# Patient Record
Sex: MALE | Race: WHITE | NOT HISPANIC OR LATINO | Employment: OTHER | URBAN - NONMETROPOLITAN AREA
[De-identification: names, ages, dates, MRNs, and addresses within clinical notes are randomized per-mention and may not be internally consistent; named-entity substitution may affect disease eponyms.]

---

## 2017-04-28 ENCOUNTER — HOSPITAL ENCOUNTER (INPATIENT)
Facility: HOSPITAL | Age: 72
LOS: 1 days | Discharge: HOME | End: 2017-04-28
Admitting: INTERNAL MEDICINE

## 2017-04-28 ENCOUNTER — HOSPITAL ENCOUNTER (INPATIENT)
Facility: HOSPITAL | Age: 72
LOS: 3 days | Discharge: HOME | End: 2017-05-01
Attending: INTERNAL MEDICINE | Admitting: INTERNAL MEDICINE

## 2017-04-28 VITALS
HEIGHT: 67 IN | RESPIRATION RATE: 18 BRPM | TEMPERATURE: 97 F | BODY MASS INDEX: 33.51 KG/M2 | SYSTOLIC BLOOD PRESSURE: 115 MMHG | OXYGEN SATURATION: 100 % | DIASTOLIC BLOOD PRESSURE: 78 MMHG | WEIGHT: 213.5 LBS | HEART RATE: 71 BPM

## 2017-04-28 DIAGNOSIS — E78.2 MIXED HYPERLIPIDEMIA: ICD-10-CM

## 2017-04-28 DIAGNOSIS — R09.81 HEAD CONGESTION: Primary | ICD-10-CM

## 2017-04-28 PROCEDURE — 93005 ELECTROCARDIOGRAM TRACING: CPT | Performed by: INTERNAL MEDICINE

## 2017-04-28 PROCEDURE — 84484 ASSAY OF TROPONIN QUANT: CPT | Performed by: INTERNAL MEDICINE

## 2017-04-28 RX ORDER — ASPIRIN 81 MG/1
81 TABLET ORAL DAILY
COMMUNITY

## 2017-04-28 RX ORDER — EPTIFIBATIDE 0.75 MG/ML
2 INJECTION, SOLUTION INTRAVENOUS CONTINUOUS
Status: DISCONTINUED | OUTPATIENT
Start: 2017-04-29 | End: 2017-04-29

## 2017-04-28 RX ORDER — METOPROLOL TARTRATE 50 MG/1
50 TABLET, FILM COATED ORAL 2 TIMES DAILY
Status: ON HOLD | COMMUNITY
End: 2017-05-01

## 2017-04-28 RX ORDER — NITROGLYCERIN 0.4 MG/1
0.4 TABLET SUBLINGUAL
Status: DISCONTINUED | OUTPATIENT
Start: 2017-04-28 | End: 2017-05-01 | Stop reason: HOSPADM

## 2017-04-28 RX ORDER — ACETAMINOPHEN 325 MG/1
650 TABLET ORAL EVERY 6 HOURS PRN
Status: DISCONTINUED | OUTPATIENT
Start: 2017-04-28 | End: 2017-05-01 | Stop reason: HOSPADM

## 2017-04-28 RX ORDER — EPTIFIBATIDE 20 MG/10ML
180 INJECTION INTRAVENOUS ONCE
Status: DISCONTINUED | OUTPATIENT
Start: 2017-04-29 | End: 2017-04-29

## 2017-04-28 RX ADMIN — EPTIFIBATIDE 2 MCG/KG/MIN: 0.75 INJECTION, SOLUTION INTRAVENOUS at 23:57

## 2017-04-29 ENCOUNTER — APPOINTMENT (OUTPATIENT)
Dept: GENERAL RADIOLOGY | Facility: HOSPITAL | Age: 72
End: 2017-04-29

## 2017-04-29 ENCOUNTER — APPOINTMENT (OUTPATIENT)
Dept: CT IMAGING | Facility: HOSPITAL | Age: 72
End: 2017-04-29

## 2017-04-29 PROBLEM — R55 SYNCOPE: Status: ACTIVE | Noted: 2017-04-29

## 2017-04-29 LAB
ALBUMIN SERPL-MCNC: 3.1 G/DL (ref 3.5–5)
ALBUMIN/GLOB SERPL: 0.8 G/DL (ref 1.1–2.5)
ALP SERPL-CCNC: 84 U/L (ref 24–120)
ALT SERPL W P-5'-P-CCNC: 26 U/L (ref 0–54)
ANION GAP SERPL CALCULATED.3IONS-SCNC: 10 MMOL/L (ref 4–13)
AST SERPL-CCNC: 47 U/L (ref 7–45)
BILIRUB SERPL-MCNC: 1 MG/DL (ref 0.1–1)
BUN BLD-MCNC: 15 MG/DL (ref 5–21)
BUN/CREAT SERPL: 18.3 (ref 7–25)
CALCIUM SPEC-SCNC: 8.2 MG/DL (ref 8.4–10.4)
CHLORIDE SERPL-SCNC: 104 MMOL/L (ref 98–110)
CO2 SERPL-SCNC: 25 MMOL/L (ref 24–31)
CREAT BLD-MCNC: 0.82 MG/DL (ref 0.5–1.4)
DEPRECATED RDW RBC AUTO: 57.3 FL (ref 40–54)
ERYTHROCYTE [DISTWIDTH] IN BLOOD BY AUTOMATED COUNT: 14.2 % (ref 12–15)
GFR SERPL CREATININE-BSD FRML MDRD: 93 ML/MIN/1.73
GLOBULIN UR ELPH-MCNC: 3.7 GM/DL
GLUCOSE BLD-MCNC: 144 MG/DL (ref 70–100)
HCT VFR BLD AUTO: 33.9 % (ref 40–52)
HGB BLD-MCNC: 11.9 G/DL (ref 14–18)
INR PPP: 1.11 (ref 0.91–1.09)
MCH RBC QN AUTO: 39.4 PG (ref 28–32)
MCHC RBC AUTO-ENTMCNC: 35.1 G/DL (ref 33–36)
MCV RBC AUTO: 112.3 FL (ref 82–95)
PLATELET # BLD AUTO: 89 10*3/MM3 (ref 130–400)
PMV BLD AUTO: 11.3 FL (ref 6–12)
POTASSIUM BLD-SCNC: 4.8 MMOL/L (ref 3.5–5.3)
PROT SERPL-MCNC: 6.8 G/DL (ref 6.3–8.7)
PROTHROMBIN TIME: 14.7 SECONDS (ref 11.9–14.6)
RBC # BLD AUTO: 3.02 10*6/MM3 (ref 4.8–5.9)
SODIUM BLD-SCNC: 139 MMOL/L (ref 135–145)
TROPONIN I SERPL-MCNC: 0.2 NG/ML (ref 0–0.03)
TROPONIN I SERPL-MCNC: 0.34 NG/ML (ref 0–0.03)
TROPONIN I SERPL-MCNC: 0.53 NG/ML (ref 0–0.03)
WBC NRBC COR # BLD: 6.56 10*3/MM3 (ref 4.8–10.8)

## 2017-04-29 PROCEDURE — 85610 PROTHROMBIN TIME: CPT | Performed by: INTERNAL MEDICINE

## 2017-04-29 PROCEDURE — 85027 COMPLETE CBC AUTOMATED: CPT | Performed by: INTERNAL MEDICINE

## 2017-04-29 PROCEDURE — 25010000002 EPTIFIBATIDE PER 5 MG: Performed by: INTERNAL MEDICINE

## 2017-04-29 PROCEDURE — 71275 CT ANGIOGRAPHY CHEST: CPT

## 2017-04-29 PROCEDURE — 99223 1ST HOSP IP/OBS HIGH 75: CPT | Performed by: INTERNAL MEDICINE

## 2017-04-29 PROCEDURE — 0 IOPAMIDOL PER 1 ML: Performed by: INTERNAL MEDICINE

## 2017-04-29 PROCEDURE — 80053 COMPREHEN METABOLIC PANEL: CPT | Performed by: INTERNAL MEDICINE

## 2017-04-29 PROCEDURE — 84484 ASSAY OF TROPONIN QUANT: CPT | Performed by: INTERNAL MEDICINE

## 2017-04-29 PROCEDURE — 71010 HC CHEST PA OR AP: CPT

## 2017-04-29 PROCEDURE — 25010000002 ENOXAPARIN PER 10 MG: Performed by: INTERNAL MEDICINE

## 2017-04-29 RX ORDER — SODIUM CHLORIDE 9 MG/ML
150 INJECTION, SOLUTION INTRAVENOUS CONTINUOUS
Status: DISCONTINUED | OUTPATIENT
Start: 2017-04-29 | End: 2017-04-30

## 2017-04-29 RX ORDER — METOPROLOL TARTRATE 50 MG/1
50 TABLET, FILM COATED ORAL EVERY 12 HOURS SCHEDULED
Status: DISCONTINUED | OUTPATIENT
Start: 2017-04-29 | End: 2017-05-01 | Stop reason: HOSPADM

## 2017-04-29 RX ORDER — CETIRIZINE HYDROCHLORIDE 10 MG/1
5 TABLET ORAL DAILY
Status: DISCONTINUED | OUTPATIENT
Start: 2017-04-29 | End: 2017-05-01 | Stop reason: HOSPADM

## 2017-04-29 RX ORDER — ASPIRIN 81 MG/1
81 TABLET ORAL DAILY
Status: DISCONTINUED | OUTPATIENT
Start: 2017-04-29 | End: 2017-05-01 | Stop reason: HOSPADM

## 2017-04-29 RX ADMIN — SODIUM CHLORIDE 150 ML/HR: 9 INJECTION, SOLUTION INTRAVENOUS at 15:21

## 2017-04-29 RX ADMIN — ENOXAPARIN SODIUM 100 MG: 100 INJECTION SUBCUTANEOUS at 11:36

## 2017-04-29 RX ADMIN — ASPIRIN 81 MG: 81 TABLET ORAL at 21:50

## 2017-04-29 RX ADMIN — IOPAMIDOL 123 ML: 755 INJECTION, SOLUTION INTRAVENOUS at 11:30

## 2017-04-29 RX ADMIN — EPTIFIBATIDE 2 MCG/KG/MIN: 0.75 INJECTION, SOLUTION INTRAVENOUS at 06:16

## 2017-04-29 RX ADMIN — METOPROLOL TARTRATE 50 MG: 50 TABLET ORAL at 21:50

## 2017-04-29 RX ADMIN — SODIUM CHLORIDE 150 ML/HR: 9 INJECTION, SOLUTION INTRAVENOUS at 22:35

## 2017-04-30 PROCEDURE — 25010000002 ENOXAPARIN PER 10 MG: Performed by: INTERNAL MEDICINE

## 2017-04-30 PROCEDURE — 99233 SBSQ HOSP IP/OBS HIGH 50: CPT | Performed by: INTERNAL MEDICINE

## 2017-04-30 RX ADMIN — ENOXAPARIN SODIUM 100 MG: 100 INJECTION SUBCUTANEOUS at 08:39

## 2017-04-30 RX ADMIN — METOPROLOL TARTRATE 50 MG: 50 TABLET ORAL at 08:39

## 2017-04-30 RX ADMIN — METOPROLOL TARTRATE 50 MG: 50 TABLET ORAL at 21:03

## 2017-04-30 RX ADMIN — CETIRIZINE HYDROCHLORIDE 5 MG: 10 TABLET ORAL at 08:39

## 2017-04-30 RX ADMIN — SODIUM CHLORIDE 150 ML/HR: 9 INJECTION, SOLUTION INTRAVENOUS at 19:29

## 2017-04-30 RX ADMIN — SODIUM CHLORIDE 150 ML/HR: 9 INJECTION, SOLUTION INTRAVENOUS at 05:50

## 2017-04-30 RX ADMIN — ENOXAPARIN SODIUM 100 MG: 100 INJECTION SUBCUTANEOUS at 21:03

## 2017-04-30 RX ADMIN — ASPIRIN 81 MG: 81 TABLET ORAL at 08:39

## 2017-05-01 VITALS
HEART RATE: 72 BPM | RESPIRATION RATE: 20 BRPM | DIASTOLIC BLOOD PRESSURE: 53 MMHG | WEIGHT: 218.4 LBS | TEMPERATURE: 97.8 F | OXYGEN SATURATION: 96 % | HEIGHT: 67 IN | SYSTOLIC BLOOD PRESSURE: 123 MMHG | BODY MASS INDEX: 34.28 KG/M2

## 2017-05-01 DIAGNOSIS — I27.20 MODERATE TO SEVERE PULMONARY HYPERTENSION (HCC): ICD-10-CM

## 2017-05-01 DIAGNOSIS — I50.810 RIGHT HEART FAILURE (HCC): Primary | ICD-10-CM

## 2017-05-01 LAB
ANION GAP SERPL CALCULATED.3IONS-SCNC: 10 MMOL/L (ref 4–13)
BUN BLD-MCNC: 11 MG/DL (ref 5–21)
BUN/CREAT SERPL: 15.5 (ref 7–25)
CALCIUM SPEC-SCNC: 8.2 MG/DL (ref 8.4–10.4)
CHLORIDE SERPL-SCNC: 108 MMOL/L (ref 98–110)
CO2 SERPL-SCNC: 24 MMOL/L (ref 24–31)
CREAT BLD-MCNC: 0.71 MG/DL (ref 0.5–1.4)
GFR SERPL CREATININE-BSD FRML MDRD: 109 ML/MIN/1.73
GLUCOSE BLD-MCNC: 130 MG/DL (ref 70–100)
POTASSIUM BLD-SCNC: 4.3 MMOL/L (ref 3.5–5.3)
SODIUM BLD-SCNC: 142 MMOL/L (ref 135–145)

## 2017-05-01 PROCEDURE — 25010000002 ENOXAPARIN PER 10 MG: Performed by: INTERNAL MEDICINE

## 2017-05-01 PROCEDURE — 80048 BASIC METABOLIC PNL TOTAL CA: CPT | Performed by: INTERNAL MEDICINE

## 2017-05-01 PROCEDURE — 99239 HOSP IP/OBS DSCHRG MGMT >30: CPT | Performed by: INTERNAL MEDICINE

## 2017-05-01 RX ORDER — ATORVASTATIN CALCIUM 40 MG/1
40 TABLET, FILM COATED ORAL NIGHTLY
Qty: 30 TABLET | Refills: 11 | Status: SHIPPED | OUTPATIENT
Start: 2017-05-01 | End: 2018-03-29 | Stop reason: SDUPTHER

## 2017-05-01 RX ORDER — GLIMEPIRIDE 1 MG/1
1 TABLET ORAL DAILY
COMMUNITY

## 2017-05-01 RX ORDER — METOPROLOL TARTRATE 50 MG/1
50 TABLET, FILM COATED ORAL 2 TIMES DAILY
Qty: 60 TABLET | Refills: 11 | Status: SHIPPED | OUTPATIENT
Start: 2017-05-01 | End: 2017-05-08 | Stop reason: DRUGHIGH

## 2017-05-01 RX ADMIN — CETIRIZINE HYDROCHLORIDE 5 MG: 10 TABLET ORAL at 09:00

## 2017-05-01 RX ADMIN — METOPROLOL TARTRATE 50 MG: 50 TABLET ORAL at 09:00

## 2017-05-01 RX ADMIN — ENOXAPARIN SODIUM 100 MG: 100 INJECTION SUBCUTANEOUS at 09:00

## 2017-05-01 RX ADMIN — ASPIRIN 81 MG: 81 TABLET ORAL at 09:00

## 2017-05-03 ENCOUNTER — HOSPITAL ENCOUNTER (OUTPATIENT)
Facility: HOSPITAL | Age: 72
Setting detail: HOSPITAL OUTPATIENT SURGERY
End: 2017-05-03
Attending: INTERNAL MEDICINE | Admitting: INTERNAL MEDICINE

## 2017-05-03 DIAGNOSIS — I50.810 RIGHT HEART FAILURE (HCC): ICD-10-CM

## 2017-05-08 ENCOUNTER — APPOINTMENT (OUTPATIENT)
Dept: CT IMAGING | Facility: HOSPITAL | Age: 72
End: 2017-05-08

## 2017-05-08 ENCOUNTER — APPOINTMENT (OUTPATIENT)
Dept: GENERAL RADIOLOGY | Facility: HOSPITAL | Age: 72
End: 2017-05-08

## 2017-05-08 ENCOUNTER — HOSPITAL ENCOUNTER (INPATIENT)
Facility: HOSPITAL | Age: 72
LOS: 2 days | Discharge: ANOTHER HOSPITAL | End: 2017-05-12
Attending: EMERGENCY MEDICINE | Admitting: INTERNAL MEDICINE

## 2017-05-08 DIAGNOSIS — R06.09 DYSPNEA ON EXERTION: Primary | ICD-10-CM

## 2017-05-08 LAB
ALBUMIN SERPL-MCNC: 3.5 G/DL (ref 3.5–5)
ALBUMIN/GLOB SERPL: 0.9 G/DL (ref 1.1–2.5)
ALP SERPL-CCNC: 97 U/L (ref 24–120)
ALT SERPL W P-5'-P-CCNC: 28 U/L (ref 0–54)
AMYLASE SERPL-CCNC: 76 U/L (ref 30–110)
ANION GAP SERPL CALCULATED.3IONS-SCNC: 12 MMOL/L (ref 4–13)
APTT PPP: 34.5 SECONDS (ref 24.1–34.8)
ARTERIAL PATENCY WRIST A: ABNORMAL
AST SERPL-CCNC: 49 U/L (ref 7–45)
ATMOSPHERIC PRESS: ABNORMAL MMHG
BACTERIA UR QL AUTO: ABNORMAL /HPF
BASE EXCESS BLDA CALC-SCNC: -0.8 MMOL/L (ref -2–2)
BASOPHILS # BLD AUTO: 0.02 10*3/MM3 (ref 0–0.2)
BASOPHILS NFR BLD AUTO: 0.3 % (ref 0–2)
BDY SITE: ABNORMAL
BILIRUB SERPL-MCNC: 0.9 MG/DL (ref 0.1–1)
BILIRUB UR QL STRIP: NEGATIVE
BUN BLD-MCNC: 22 MG/DL (ref 5–21)
BUN/CREAT SERPL: 24.4 (ref 7–25)
CALCIUM SPEC-SCNC: 9.3 MG/DL (ref 8.4–10.4)
CHLORIDE SERPL-SCNC: 103 MMOL/L (ref 98–110)
CLARITY UR: CLEAR
CO2 SERPL-SCNC: 25 MMOL/L (ref 24–31)
COHGB MFR BLD: 1.2 % (ref 0–5.1)
COLOR UR: ABNORMAL
CREAT BLD-MCNC: 0.9 MG/DL (ref 0.5–1.4)
D DIMER PPP FEU-MCNC: 2.6 MG/L (FEU) (ref 0–0.5)
DEPRECATED RDW RBC AUTO: 58.9 FL (ref 40–54)
EOSINOPHIL # BLD AUTO: 0.15 10*3/MM3 (ref 0–0.7)
EOSINOPHIL NFR BLD AUTO: 2.4 % (ref 0–4)
ERYTHROCYTE [DISTWIDTH] IN BLOOD BY AUTOMATED COUNT: 14.7 % (ref 12–15)
GAS FLOW AIRWAY: 4 LPM
GFR SERPL CREATININE-BSD FRML MDRD: 83 ML/MIN/1.73
GLOBULIN UR ELPH-MCNC: 3.9 GM/DL
GLUCOSE BLD-MCNC: 200 MG/DL (ref 70–100)
GLUCOSE UR STRIP-MCNC: NEGATIVE MG/DL
HCO3 BLDA-SCNC: 22.8 MMOL/L (ref 22–26)
HCT VFR BLD AUTO: 31.2 % (ref 40–52)
HCT VFR BLD CALC: 32 % (ref 38–51)
HGB BLD-MCNC: 11 G/DL (ref 14–18)
HGB BLDA-MCNC: 11 G/DL (ref 14–18)
HGB UR QL STRIP.AUTO: NEGATIVE
HYALINE CASTS UR QL AUTO: ABNORMAL /LPF
IMM GRANULOCYTES # BLD: 0.02 10*3/MM3 (ref 0–0.03)
IMM GRANULOCYTES NFR BLD: 0.3 % (ref 0–5)
INR PPP: 1.16 (ref 0.91–1.09)
KETONES UR QL STRIP: ABNORMAL
LEUKOCYTE ESTERASE UR QL STRIP.AUTO: NEGATIVE
LIPASE SERPL-CCNC: 103 U/L (ref 23–203)
LYMPHOCYTES # BLD AUTO: 0.94 10*3/MM3 (ref 0.72–4.86)
LYMPHOCYTES NFR BLD AUTO: 15 % (ref 15–45)
MACROCYTES BLD QL SMEAR: NORMAL
MCH RBC QN AUTO: 39.1 PG (ref 28–32)
MCHC RBC AUTO-ENTMCNC: 35.3 G/DL (ref 33–36)
MCV RBC AUTO: 111 FL (ref 82–95)
METHGB BLD QL: 0.3 % (ref 0.4–1.5)
MODALITY: ABNORMAL
MONOCYTES # BLD AUTO: 0.71 10*3/MM3 (ref 0.19–1.3)
MONOCYTES NFR BLD AUTO: 11.3 % (ref 4–12)
NEUTROPHILS # BLD AUTO: 4.42 10*3/MM3 (ref 1.87–8.4)
NEUTROPHILS NFR BLD AUTO: 70.7 % (ref 39–78)
NITRITE UR QL STRIP: NEGATIVE
NT-PROBNP SERPL-MCNC: 2780 PG/ML (ref 0–900)
OXYHGB MFR BLDV: 94.2 % (ref 94–97)
PCO2 BLDA: 34 MM HG (ref 35–45)
PH BLDA: 7.45 PH UNITS (ref 7.35–7.45)
PH UR STRIP.AUTO: 5.5 [PH] (ref 5–8)
PLAT MORPH BLD: NORMAL
PLATELET # BLD AUTO: 89 10*3/MM3 (ref 130–400)
PMV BLD AUTO: 11.2 FL (ref 6–12)
PO2 BLDA: 78 MM HG (ref 80–100)
POTASSIUM BLD-SCNC: 5 MMOL/L (ref 3.5–5.3)
POTASSIUM BLDA-SCNC: 4.43 MMOL/L (ref 3.5–5)
PROT SERPL-MCNC: 7.4 G/DL (ref 6.3–8.7)
PROT UR QL STRIP: ABNORMAL
PROTHROMBIN TIME: 15.2 SECONDS (ref 11.9–14.6)
RBC # BLD AUTO: 2.81 10*6/MM3 (ref 4.8–5.9)
RBC # UR: ABNORMAL /HPF
REF LAB TEST METHOD: ABNORMAL
SODIUM BLD-SCNC: 140 MMOL/L (ref 135–145)
SODIUM BLDA-SCNC: 138.7 MMOL/L (ref 135–145)
SP GR UR STRIP: 1.03 (ref 1–1.03)
SQUAMOUS #/AREA URNS HPF: ABNORMAL /HPF
TROPONIN I SERPL-MCNC: 0.09 NG/ML (ref 0–0.03)
TROPONIN I SERPL-MCNC: 0.17 NG/ML (ref 0–0.07)
UROBILINOGEN UR QL STRIP: ABNORMAL
WBC MORPH BLD: NORMAL
WBC NRBC COR # BLD: 6.26 10*3/MM3 (ref 4.8–10.8)
WBC UR QL AUTO: ABNORMAL /HPF

## 2017-05-08 PROCEDURE — 85730 THROMBOPLASTIN TIME PARTIAL: CPT | Performed by: EMERGENCY MEDICINE

## 2017-05-08 PROCEDURE — 85007 BL SMEAR W/DIFF WBC COUNT: CPT | Performed by: EMERGENCY MEDICINE

## 2017-05-08 PROCEDURE — 71275 CT ANGIOGRAPHY CHEST: CPT

## 2017-05-08 PROCEDURE — 83050 HGB METHEMOGLOBIN QUAN: CPT

## 2017-05-08 PROCEDURE — 81001 URINALYSIS AUTO W/SCOPE: CPT | Performed by: EMERGENCY MEDICINE

## 2017-05-08 PROCEDURE — 85025 COMPLETE CBC W/AUTO DIFF WBC: CPT | Performed by: EMERGENCY MEDICINE

## 2017-05-08 PROCEDURE — 83690 ASSAY OF LIPASE: CPT | Performed by: EMERGENCY MEDICINE

## 2017-05-08 PROCEDURE — 83880 ASSAY OF NATRIURETIC PEPTIDE: CPT | Performed by: EMERGENCY MEDICINE

## 2017-05-08 PROCEDURE — 93005 ELECTROCARDIOGRAM TRACING: CPT | Performed by: EMERGENCY MEDICINE

## 2017-05-08 PROCEDURE — 36600 WITHDRAWAL OF ARTERIAL BLOOD: CPT

## 2017-05-08 PROCEDURE — 93010 ELECTROCARDIOGRAM REPORT: CPT | Performed by: INTERNAL MEDICINE

## 2017-05-08 PROCEDURE — 84484 ASSAY OF TROPONIN QUANT: CPT

## 2017-05-08 PROCEDURE — 85610 PROTHROMBIN TIME: CPT | Performed by: EMERGENCY MEDICINE

## 2017-05-08 PROCEDURE — 82375 ASSAY CARBOXYHB QUANT: CPT

## 2017-05-08 PROCEDURE — 84484 ASSAY OF TROPONIN QUANT: CPT | Performed by: EMERGENCY MEDICINE

## 2017-05-08 PROCEDURE — 80053 COMPREHEN METABOLIC PANEL: CPT | Performed by: EMERGENCY MEDICINE

## 2017-05-08 PROCEDURE — 82150 ASSAY OF AMYLASE: CPT | Performed by: EMERGENCY MEDICINE

## 2017-05-08 PROCEDURE — 71010 HC CHEST PA OR AP: CPT

## 2017-05-08 PROCEDURE — 36415 COLL VENOUS BLD VENIPUNCTURE: CPT | Performed by: EMERGENCY MEDICINE

## 2017-05-08 PROCEDURE — 0 IOPAMIDOL PER 1 ML: Performed by: EMERGENCY MEDICINE

## 2017-05-08 PROCEDURE — 85379 FIBRIN DEGRADATION QUANT: CPT | Performed by: EMERGENCY MEDICINE

## 2017-05-08 PROCEDURE — 99285 EMERGENCY DEPT VISIT HI MDM: CPT

## 2017-05-08 PROCEDURE — 82805 BLOOD GASES W/O2 SATURATION: CPT

## 2017-05-08 RX ORDER — METOPROLOL TARTRATE 50 MG/1
50 TABLET, FILM COATED ORAL EVERY 12 HOURS
COMMUNITY
End: 2017-07-27 | Stop reason: ALTCHOICE

## 2017-05-08 RX ORDER — SODIUM CHLORIDE 9 MG/ML
125 INJECTION, SOLUTION INTRAVENOUS CONTINUOUS
Status: DISCONTINUED | OUTPATIENT
Start: 2017-05-08 | End: 2017-05-09

## 2017-05-08 RX ADMIN — IOPAMIDOL 150 ML: 755 INJECTION, SOLUTION INTRAVENOUS at 22:55

## 2017-05-08 RX ADMIN — SODIUM CHLORIDE 125 ML/HR: 9 INJECTION, SOLUTION INTRAVENOUS at 21:15

## 2017-05-09 PROBLEM — I27.20 PULMONARY HYPERTENSION (HCC): Status: ACTIVE | Noted: 2017-05-09

## 2017-05-09 PROBLEM — Z95.3 S/P AORTIC VALVE REPLACEMENT WITH BIOPROSTHETIC VALVE: Status: ACTIVE | Noted: 2017-05-09

## 2017-05-09 PROBLEM — R06.09 DYSPNEA ON EXERTION: Status: ACTIVE | Noted: 2017-05-09

## 2017-05-09 PROBLEM — J96.21 ACUTE ON CHRONIC RESPIRATORY FAILURE WITH HYPOXIA (HCC): Status: ACTIVE | Noted: 2017-05-09

## 2017-05-09 LAB — CA-I BLDA-SCNC: 1.18 MMOL/L (ref 1.1–1.35)

## 2017-05-09 PROCEDURE — G0378 HOSPITAL OBSERVATION PER HR: HCPCS

## 2017-05-09 PROCEDURE — 99222 1ST HOSP IP/OBS MODERATE 55: CPT | Performed by: INTERNAL MEDICINE

## 2017-05-09 PROCEDURE — 25010000002 MIDAZOLAM PER 1 MG: Performed by: INTERNAL MEDICINE

## 2017-05-09 PROCEDURE — 82330 ASSAY OF CALCIUM: CPT

## 2017-05-09 PROCEDURE — 93451 RIGHT HEART CATH: CPT | Performed by: INTERNAL MEDICINE

## 2017-05-09 PROCEDURE — 99152 MOD SED SAME PHYS/QHP 5/>YRS: CPT | Performed by: INTERNAL MEDICINE

## 2017-05-09 PROCEDURE — 25010000002 DIPHENHYDRAMINE PER 50 MG: Performed by: INTERNAL MEDICINE

## 2017-05-09 PROCEDURE — 4A023N6 MEASUREMENT OF CARDIAC SAMPLING AND PRESSURE, RIGHT HEART, PERCUTANEOUS APPROACH: ICD-10-PCS | Performed by: INTERNAL MEDICINE

## 2017-05-09 PROCEDURE — C1894 INTRO/SHEATH, NON-LASER: HCPCS | Performed by: INTERNAL MEDICINE

## 2017-05-09 RX ORDER — ATORVASTATIN CALCIUM 40 MG/1
40 TABLET, FILM COATED ORAL NIGHTLY
Status: DISCONTINUED | OUTPATIENT
Start: 2017-05-09 | End: 2017-05-12 | Stop reason: HOSPADM

## 2017-05-09 RX ORDER — DIPHENHYDRAMINE HYDROCHLORIDE 50 MG/ML
INJECTION INTRAMUSCULAR; INTRAVENOUS AS NEEDED
Status: DISCONTINUED | OUTPATIENT
Start: 2017-05-09 | End: 2017-05-09 | Stop reason: HOSPADM

## 2017-05-09 RX ORDER — ASPIRIN 81 MG/1
81 TABLET ORAL DAILY
Status: DISCONTINUED | OUTPATIENT
Start: 2017-05-09 | End: 2017-05-12 | Stop reason: HOSPADM

## 2017-05-09 RX ORDER — MIDAZOLAM HYDROCHLORIDE 1 MG/ML
INJECTION INTRAMUSCULAR; INTRAVENOUS AS NEEDED
Status: DISCONTINUED | OUTPATIENT
Start: 2017-05-09 | End: 2017-05-09 | Stop reason: HOSPADM

## 2017-05-09 RX ORDER — METOPROLOL TARTRATE 50 MG/1
50 TABLET, FILM COATED ORAL EVERY 12 HOURS
Status: DISCONTINUED | OUTPATIENT
Start: 2017-05-09 | End: 2017-05-12 | Stop reason: HOSPADM

## 2017-05-09 RX ORDER — LIDOCAINE HYDROCHLORIDE 20 MG/ML
INJECTION, SOLUTION INFILTRATION; PERINEURAL AS NEEDED
Status: DISCONTINUED | OUTPATIENT
Start: 2017-05-09 | End: 2017-05-09 | Stop reason: HOSPADM

## 2017-05-09 RX ADMIN — DESMOPRESSIN ACETATE 40 MG: 0.2 TABLET ORAL at 21:13

## 2017-05-09 RX ADMIN — METOPROLOL TARTRATE 50 MG: 50 TABLET ORAL at 12:34

## 2017-05-09 RX ADMIN — METOPROLOL TARTRATE 50 MG: 50 TABLET ORAL at 21:13

## 2017-05-09 RX ADMIN — ASPIRIN 81 MG: 81 TABLET ORAL at 12:34

## 2017-05-10 LAB
GLUCOSE BLDC GLUCOMTR-MCNC: 178 MG/DL (ref 70–130)
GLUCOSE BLDC GLUCOMTR-MCNC: 188 MG/DL (ref 70–130)
GLUCOSE BLDC GLUCOMTR-MCNC: 270 MG/DL (ref 70–130)

## 2017-05-10 PROCEDURE — 99233 SBSQ HOSP IP/OBS HIGH 50: CPT | Performed by: INTERNAL MEDICINE

## 2017-05-10 PROCEDURE — 63710000001 INSULIN LISPRO (HUMAN) PER 5 UNITS: Performed by: INTERNAL MEDICINE

## 2017-05-10 PROCEDURE — 82962 GLUCOSE BLOOD TEST: CPT

## 2017-05-10 RX ADMIN — INSULIN LISPRO 2 UNITS: 100 INJECTION, SOLUTION INTRAVENOUS; SUBCUTANEOUS at 21:16

## 2017-05-10 RX ADMIN — METOPROLOL TARTRATE 50 MG: 50 TABLET ORAL at 09:03

## 2017-05-10 RX ADMIN — DESMOPRESSIN ACETATE 40 MG: 0.2 TABLET ORAL at 21:08

## 2017-05-10 RX ADMIN — METOPROLOL TARTRATE 50 MG: 50 TABLET ORAL at 21:08

## 2017-05-10 RX ADMIN — ASPIRIN 81 MG: 81 TABLET ORAL at 09:04

## 2017-05-10 RX ADMIN — INSULIN LISPRO 6 UNITS: 100 INJECTION, SOLUTION INTRAVENOUS; SUBCUTANEOUS at 12:58

## 2017-05-11 PROBLEM — R53.1 WEAKNESS GENERALIZED: Status: ACTIVE | Noted: 2017-05-11

## 2017-05-11 LAB
GLUCOSE BLDC GLUCOMTR-MCNC: 143 MG/DL (ref 70–130)
GLUCOSE BLDC GLUCOMTR-MCNC: 158 MG/DL (ref 70–130)
GLUCOSE BLDC GLUCOMTR-MCNC: 185 MG/DL (ref 70–130)
GLUCOSE BLDC GLUCOMTR-MCNC: 249 MG/DL (ref 70–130)

## 2017-05-11 PROCEDURE — 82962 GLUCOSE BLOOD TEST: CPT

## 2017-05-11 PROCEDURE — 99232 SBSQ HOSP IP/OBS MODERATE 35: CPT | Performed by: NURSE PRACTITIONER

## 2017-05-11 PROCEDURE — 25010000002 ENOXAPARIN PER 10 MG: Performed by: INTERNAL MEDICINE

## 2017-05-11 RX ADMIN — METOPROLOL TARTRATE 50 MG: 50 TABLET ORAL at 20:37

## 2017-05-11 RX ADMIN — INSULIN LISPRO 3 UNITS: 100 INJECTION, SOLUTION INTRAVENOUS; SUBCUTANEOUS at 12:24

## 2017-05-11 RX ADMIN — DESMOPRESSIN ACETATE 40 MG: 0.2 TABLET ORAL at 20:36

## 2017-05-11 RX ADMIN — ENOXAPARIN SODIUM 40 MG: 40 INJECTION SUBCUTANEOUS at 08:40

## 2017-05-11 RX ADMIN — INSULIN LISPRO 2 UNITS: 100 INJECTION, SOLUTION INTRAVENOUS; SUBCUTANEOUS at 08:40

## 2017-05-11 RX ADMIN — METOPROLOL TARTRATE 50 MG: 50 TABLET ORAL at 08:40

## 2017-05-11 RX ADMIN — ASPIRIN 81 MG: 81 TABLET ORAL at 08:40

## 2017-05-11 RX ADMIN — INSULIN LISPRO 2 UNITS: 100 INJECTION, SOLUTION INTRAVENOUS; SUBCUTANEOUS at 20:38

## 2017-05-12 VITALS
SYSTOLIC BLOOD PRESSURE: 104 MMHG | WEIGHT: 210 LBS | BODY MASS INDEX: 32.96 KG/M2 | OXYGEN SATURATION: 97 % | DIASTOLIC BLOOD PRESSURE: 64 MMHG | HEART RATE: 67 BPM | HEIGHT: 67 IN | TEMPERATURE: 97.4 F | RESPIRATION RATE: 20 BRPM

## 2017-05-12 LAB
GLUCOSE BLDC GLUCOMTR-MCNC: 146 MG/DL (ref 70–130)
GLUCOSE BLDC GLUCOMTR-MCNC: 161 MG/DL (ref 70–130)
GLUCOSE BLDC GLUCOMTR-MCNC: 164 MG/DL (ref 70–130)

## 2017-05-12 PROCEDURE — 25010000002 ENOXAPARIN PER 10 MG: Performed by: INTERNAL MEDICINE

## 2017-05-12 PROCEDURE — 82962 GLUCOSE BLOOD TEST: CPT

## 2017-05-12 PROCEDURE — 99239 HOSP IP/OBS DSCHRG MGMT >30: CPT | Performed by: INTERNAL MEDICINE

## 2017-05-12 RX ADMIN — ENOXAPARIN SODIUM 40 MG: 40 INJECTION SUBCUTANEOUS at 08:42

## 2017-05-12 RX ADMIN — METOPROLOL TARTRATE 50 MG: 50 TABLET ORAL at 08:42

## 2017-05-12 RX ADMIN — INSULIN LISPRO 2 UNITS: 100 INJECTION, SOLUTION INTRAVENOUS; SUBCUTANEOUS at 08:42

## 2017-05-12 RX ADMIN — ASPIRIN 81 MG: 81 TABLET ORAL at 08:42

## 2017-05-12 RX ADMIN — INSULIN LISPRO 2 UNITS: 100 INJECTION, SOLUTION INTRAVENOUS; SUBCUTANEOUS at 13:48

## 2017-07-27 ENCOUNTER — OFFICE VISIT (OUTPATIENT)
Dept: CARDIOLOGY | Facility: CLINIC | Age: 72
End: 2017-07-27

## 2017-07-27 VITALS
HEIGHT: 67 IN | DIASTOLIC BLOOD PRESSURE: 62 MMHG | WEIGHT: 201.4 LBS | HEART RATE: 103 BPM | BODY MASS INDEX: 31.61 KG/M2 | SYSTOLIC BLOOD PRESSURE: 124 MMHG

## 2017-07-27 DIAGNOSIS — J96.11 CHRONIC RESPIRATORY FAILURE WITH HYPOXIA (HCC): ICD-10-CM

## 2017-07-27 DIAGNOSIS — Z95.2 S/P TAVR (TRANSCATHETER AORTIC VALVE REPLACEMENT): ICD-10-CM

## 2017-07-27 DIAGNOSIS — I25.10 CORONARY ARTERY DISEASE INVOLVING NATIVE CORONARY ARTERY OF NATIVE HEART WITHOUT ANGINA PECTORIS: ICD-10-CM

## 2017-07-27 DIAGNOSIS — I27.20 PULMONARY HYPERTENSION (HCC): Primary | ICD-10-CM

## 2017-07-27 PROBLEM — R53.1 WEAKNESS GENERALIZED: Status: RESOLVED | Noted: 2017-05-11 | Resolved: 2017-07-27

## 2017-07-27 PROBLEM — R06.09 DYSPNEA ON EXERTION: Status: RESOLVED | Noted: 2017-05-09 | Resolved: 2017-07-27

## 2017-07-27 PROBLEM — R55 SYNCOPE: Status: RESOLVED | Noted: 2017-04-29 | Resolved: 2017-07-27

## 2017-07-27 PROCEDURE — 93000 ELECTROCARDIOGRAM COMPLETE: CPT | Performed by: INTERNAL MEDICINE

## 2017-07-27 PROCEDURE — 99214 OFFICE O/P EST MOD 30 MIN: CPT | Performed by: INTERNAL MEDICINE

## 2017-07-27 RX ORDER — SILDENAFIL CITRATE 20 MG/1
TABLET ORAL
Refills: 3 | COMMUNITY
Start: 2017-06-24

## 2017-07-27 RX ORDER — POTASSIUM CHLORIDE 20 MEQ/1
TABLET, EXTENDED RELEASE ORAL
Refills: 1 | COMMUNITY
Start: 2017-06-24 | End: 2018-07-31

## 2017-07-27 RX ORDER — FUROSEMIDE 40 MG/1
TABLET ORAL
Refills: 0 | COMMUNITY
Start: 2017-06-24

## 2017-07-27 RX ORDER — CLOPIDOGREL BISULFATE 75 MG/1
TABLET ORAL
Refills: 3 | COMMUNITY
Start: 2017-06-24 | End: 2018-07-31

## 2017-07-27 NOTE — ASSESSMENT & PLAN NOTE
Stable; no angina.  Continue aspirin and Plavix as per plan for TAVR, continue high potency statin.

## 2017-07-27 NOTE — PROGRESS NOTES
Subjective:     Encounter Date:07/27/2017      Patient ID: Ludin Christina is a 71 y.o. male.    Chief Complaint: Follow-up for valvular heart disease, coronary disease, and pulmonary hypertension  History of Present Illness  Mr. Christian is a pleasant 71-year-old male whom I first met during 2 sequential hospitalizations in late April then early May of 2017.  He had initially presented to [place] with syncope and was ultimately transferred here with a non-ST elevation myocardial infarction.  However, diagnostic catheterization performed in [place] did not seem to show a culprit lesion, but rather chronic severe coronary lesions.  I felt as though his non-STEMI was a type II event.  Further workup including echocardiogram revealed a multitude of other intracardiac abnormalities that could have contributed to syncope, including at least moderate if not severe bioprosthetic aortic stenosis, left ventricular outflow tract gradient, mild to moderate mitral stenosis, severe pulmonary hypertension, and a dilated right ventricle with reduced function.  Ultimately, he was transferred to [facility] for further assessment and treatment of his pulmonary hypertension.  He remained hospitalized there for over 2 weeks, and underwent a transcatheter aortic valve replacement.  While there, he also was started on body of pulmonary hypertension.  He was also diagnosed with cirrhosis and hemochromatosis.     He returns today for his first outpatient evaluation with me.  Overall, he is doing much better than when I first met him in the hospital.  Has been going to cardiac rehab 3x week.  Has more energy, but still short of breath.  Uses oxygen when exerting himself. Now can walk almost all the way to his boat house (~ 70 yards) without oxygen before having to stop.  Prior to hospitalization, could not walk even partly there without stopping due to dyspnea.  No orthopnea, PND, LE edema.   Overall, sleep quality much better.  He has had no  other syncope or near-syncope.      CAD - has multiple lesions.  Treating medically.  No angina. No palpitations.     Only c/o is cough - worse in the morning. Mostly productive been clear/yellow.  Been going on a few years.     The following portions of the patient's history were reviewed and updated as appropriate: allergies, current medications, past family history, past medical history, past social history, past surgical history and problem list.    Review of Systems   Constitution: Negative for malaise/fatigue.   Cardiovascular: Positive for dyspnea on exertion. Negative for chest pain, claudication, leg swelling, near-syncope, orthopnea, palpitations, paroxysmal nocturnal dyspnea and syncope.   Respiratory: Positive for cough. Negative for shortness of breath.    Hematologic/Lymphatic: Does not bruise/bleed easily.          Objective:      Vitals:    07/27/17 1047   BP: 124/62   Pulse: 103     Physical Exam   Constitutional: He is oriented to person, place, and time. He appears well-developed and well-nourished.   Neck: No JVD present.   Cardiovascular: Normal rate, regular rhythm and intact distal pulses.    Murmur heard.   Harsh midsystolic murmur is present with a grade of 3/6  at the upper right sternal border radiating to the neck  Pulmonary/Chest: Effort normal and breath sounds normal.   Musculoskeletal: He exhibits no edema.   Neurological: He is alert and oriented to person, place, and time.   Skin: Skin is warm and dry.       Lab Review:         ECG 12 Lead  Date/Time: 7/27/2017 4:04 PM  Performed by: DERICK RESENDEZ  Authorized by: DERICK RESENDEZ   Rhythm: sinus tachycardia  BPM: 103  T depression: III and aVF  QRS axis: right  Q waves: III and aVF  Clinical impression: abnormal ECG            I reviewed old records from the hospitalization to [facility], including the discharge summary.  Through this, I read the summary of the patient's Alvaro procedure and ultimately his diagnosis with cirrhosis  and portal hypertension.  He had occlusive thrombus in the right portal vein and nonocclusive thrombus in the main portal vein.  Apparently hepatology.did not recommend anticoagulating for these.  He has separately been determined to have hemochromatosis and is followed with hematologist [facility] recently.  Assessment/Plan:     Problem List Items Addressed This Visit        Cardiovascular and Mediastinum    Pulmonary hypertension - Primary    Current Assessment & Plan     On Revatio; continue this.         S/P TAVR (transcatheter aortic valve replacement)    Overview     Valve-in-valve, performed at [facility] on 5/24/17         Current Assessment & Plan     Should remain on aspirin and Plavix for at least 6 months.  Still following up with cardiology at [facility].         Coronary artery disease involving native coronary artery without angina pectoris    Overview     Last diagnostic heart catheterization performed in [place] by Dr. Broadbent on 4/28/17 prior to transfer, which showed nonobstructive disease in the left main and LAD.  Circumflex had a complex bifurcation stenosis (by Maria [place]isa 1, 1, 1) involving the takeoff of an obtuse marginal branch.  Right-PDA had previously been intervened upon with balloon angioplasty but stent reportedly could not be delivered due to calcification in 2015 by Dr. Broadbent.  Severely calcified and severe stenosis in the proximal PDA was visualized (99%, CARLOS-1 flow) on cath 4/28/17.         Current Assessment & Plan     Stable; no angina.  Continue aspirin and Plavix as per plan for TAVR, continue high potency statin.         Relevant Medications    clopidogrel (PLAVIX) 75 MG tablet    sildenafil (REVATIO) 20 MG tablet       Respiratory    Chronic respiratory failure with hypoxia    Overview     Due to pulmonary hypertension.  Wears oxygen with exertion and at night.             Follow-up in 1 year, we will need to repeat echocardiogram at that time.  I advised the patient to  call us sooner if any new symptoms develop.  Continue all follow-up with [facility] as planned.    Patrick Trujillo MD  07/27/2017  4:11 PM

## 2017-07-27 NOTE — ASSESSMENT & PLAN NOTE
Should remain on aspirin and Plavix for at least 6 months.  Still following up with cardiology at [facility].

## 2018-03-30 RX ORDER — ATORVASTATIN CALCIUM 40 MG/1
40 TABLET, FILM COATED ORAL NIGHTLY
Qty: 90 TABLET | Refills: 3 | Status: SHIPPED | OUTPATIENT
Start: 2018-03-30 | End: 2018-04-23 | Stop reason: SDUPTHER

## 2018-04-17 RX ORDER — METOPROLOL TARTRATE 50 MG/1
TABLET, FILM COATED ORAL
Qty: 60 TABLET | Refills: 2 | Status: SHIPPED | OUTPATIENT
Start: 2018-04-17 | End: 2018-07-09 | Stop reason: SDUPTHER

## 2018-04-23 RX ORDER — ATORVASTATIN CALCIUM 40 MG/1
TABLET, FILM COATED ORAL
Qty: 30 TABLET | Refills: 2 | Status: SHIPPED | OUTPATIENT
Start: 2018-04-23

## 2018-07-09 RX ORDER — METOPROLOL TARTRATE 50 MG/1
TABLET, FILM COATED ORAL
Qty: 60 TABLET | Refills: 0 | Status: SHIPPED | OUTPATIENT
Start: 2018-07-09 | End: 2018-07-31

## 2018-07-23 ENCOUNTER — TELEPHONE (OUTPATIENT)
Dept: CARDIOLOGY | Facility: CLINIC | Age: 73
End: 2018-07-23

## 2018-07-23 DIAGNOSIS — Z95.2 S/P TAVR (TRANSCATHETER AORTIC VALVE REPLACEMENT): Primary | ICD-10-CM

## 2018-07-23 DIAGNOSIS — I27.20 PULMONARY HYPERTENSION (HCC): ICD-10-CM

## 2018-07-23 NOTE — TELEPHONE ENCOUNTER
Ok I placed order - please see if I did it correctly.  Ask them to get a disc with images of echo from [facility] to bring with him to our appt. Thanks

## 2018-07-23 NOTE — TELEPHONE ENCOUNTER
Patient wife calling in stating that they would like to get the echo done at [facility], that they are supposed to have for his appointment on 07/31/18.     Could you please place the order and state to have done in [place] and I will fax it over to them.

## 2018-07-27 ENCOUNTER — TELEPHONE (OUTPATIENT)
Dept: CARDIOLOGY | Facility: CLINIC | Age: 73
End: 2018-07-27

## 2018-07-27 NOTE — TELEPHONE ENCOUNTER
Called patient, spoke to wife about Echo that needed to be done before appointment on Tuesday.     - Patient wife advised she will go get the disk Monday and bring it with her to his appointment on Tuesday, due to it being done at [facility].

## 2018-07-31 ENCOUNTER — OFFICE VISIT (OUTPATIENT)
Dept: CARDIOLOGY | Facility: CLINIC | Age: 73
End: 2018-07-31

## 2018-07-31 VITALS
WEIGHT: 203 LBS | DIASTOLIC BLOOD PRESSURE: 60 MMHG | HEART RATE: 93 BPM | BODY MASS INDEX: 31.86 KG/M2 | HEIGHT: 67 IN | SYSTOLIC BLOOD PRESSURE: 101 MMHG

## 2018-07-31 DIAGNOSIS — I25.118 CORONARY ARTERY DISEASE OF NATIVE ARTERY OF NATIVE HEART WITH STABLE ANGINA PECTORIS (HCC): ICD-10-CM

## 2018-07-31 DIAGNOSIS — I27.20 PULMONARY HYPERTENSION (HCC): ICD-10-CM

## 2018-07-31 DIAGNOSIS — Z95.2 S/P TAVR (TRANSCATHETER AORTIC VALVE REPLACEMENT): Primary | ICD-10-CM

## 2018-07-31 DIAGNOSIS — J96.11 CHRONIC RESPIRATORY FAILURE WITH HYPOXIA (HCC): ICD-10-CM

## 2018-07-31 DIAGNOSIS — I34.2 NON-RHEUMATIC MITRAL VALVE STENOSIS: ICD-10-CM

## 2018-07-31 DIAGNOSIS — I25.10 CORONARY ARTERY DISEASE INVOLVING NATIVE CORONARY ARTERY OF NATIVE HEART WITHOUT ANGINA PECTORIS: ICD-10-CM

## 2018-07-31 PROBLEM — K74.60 CIRRHOSIS (HCC): Status: ACTIVE | Noted: 2018-07-31

## 2018-07-31 PROCEDURE — 99214 OFFICE O/P EST MOD 30 MIN: CPT | Performed by: INTERNAL MEDICINE

## 2018-07-31 PROCEDURE — 93000 ELECTROCARDIOGRAM COMPLETE: CPT | Performed by: INTERNAL MEDICINE

## 2018-07-31 RX ORDER — LISINOPRIL 5 MG/1
5 TABLET ORAL DAILY
COMMUNITY

## 2018-07-31 NOTE — ASSESSMENT & PLAN NOTE
Moderate on echo; will resume BB at low dose to improve ventricular filling and hopefully improve FOOTE some

## 2018-07-31 NOTE — PROGRESS NOTES
"     Subjective:     Encounter Date:07/31/2018      Patient ID: Ludin Christina is a 72 y.o. male.    Chief Complaint: annual f/u valvular disease, cad  Mr. Christina is a pleasant 71-year-old male whom I first met during 2 sequential hospitalizations in late April then early May of 2017.  He had initially presented to [facility] with syncope and was ultimately transferred here with a non-ST elevation myocardial infarction.  However, diagnostic catheterization performed in [place] did not seem to show a culprit lesion, but rather chronic severe coronary lesions.  I felt as though his non-STEMI was a type II event.  Further workup including echocardiogram revealed a multitude of other intracardiac abnormalities that could have contributed to syncope, including at least moderate if not severe bioprosthetic aortic stenosis, left ventricular outflow tract gradient, mild to moderate mitral stenosis, severe pulmonary hypertension, and a dilated right ventricle with reduced function.  Ultimately, he was transferred to [facility] for further assessment and treatment of his pulmonary hypertension.  He remained hospitalized there for over 2 weeks, and underwent a transcatheter aortic valve replacement.  While there, he also was started on Revatio for pulmonary hypertension.  He was also diagnosed with cirrhosis and hemochromatosis.     I last saw him as an outpatient a year ago, at which point he was doing much better than when I'd first met him in the hospital.  Since last year, doing about the same. Still FOOTE, and wears O2 via NC 24/7 now.  Updates on other chronic issues as per below:    CAD - had multiple lesions on cath in April '17.  Treating medically.  Has angina with minimal activity (walking to bathroom), resolves with rest.  Described as \"pressure\" like \"I'm having a heart attack.\"  States it's stable and not lifestyle-limiting.  Is more limited by FOOTE.    Pulm HTN - still with significant foote but stable.  Helped somewhat by " O2, but O2 levels still drop and HR jumps quickly with minimal exertion.     Leg swelling: stable - no orthopnea, pnd             The following portions of the patient's history were reviewed and updated as appropriate: allergies, current medications, past family history, past medical history, past social history, past surgical history and problem list.    Review of Systems   Constitution: Positive for malaise/fatigue.   Cardiovascular: Positive for chest pain, dyspnea on exertion and leg swelling. Negative for claudication, near-syncope, orthopnea, palpitations, paroxysmal nocturnal dyspnea and syncope.   Respiratory: Positive for shortness of breath.    Hematologic/Lymphatic: Does not bruise/bleed easily.              Objective:      Vitals:    07/31/18 1337   BP: 101/60   Pulse: 93     Physical Exam   Constitutional: He is oriented to person, place, and time. He appears well-developed and well-nourished.   Neck: No JVD present.   Cardiovascular: Normal rate, regular rhythm and intact distal pulses.    Murmur heard.   Harsh midsystolic murmur is present with a grade of 3/6  at the upper right sternal border radiating to the neck  Pulmonary/Chest: Effort normal and breath sounds normal.   Musculoskeletal: He exhibits no edema.   Neurological: He is alert and oriented to person, place, and time.   Skin: Skin is warm and dry.       Lab Review:         ECG 12 Lead  Date/Time: 7/31/2018 2:01 PM  Performed by: DERICK RESENDEZ  Authorized by: DERICK RESENDEZ   Rhythm: sinus rhythm  Ectopy: atrial premature contractions  BPM: 93  QRS axis: right  Other findings comments: +RVH, non-specific T wave abnormalities  Clinical impression: abnormal ECG            Reviewed images from disc provided by patient from recent echo done in [place] on 7/26/18, my interpretation:  -NL LVEF (>60%), mild LVH.  -TAVR with acceptable gradients - Vmax ~2.5, MG ~10mmHg  -moderate mitral stenosis (MG 6mmHg)  -moderately dilated RV  -excessive IVS  motion w/respiration and septal flattening in diastole and systole, c/w RV pressure overload    OSH records reviewed, from [facility]  Assessment/Plan:     Problem List Items Addressed This Visit        Cardiovascular and Mediastinum    Pulmonary hypertension    Overview     Likely a combination of portopulmonary HTN and due to left-sided dysfunction         Current Assessment & Plan     Continue O2, revatio         S/P TAVR (transcatheter aortic valve replacement) - Primary    Overview     Valve-in-valve (23mm Corevalve Evolut R), performed at [facility] on 5/24/17         Current Assessment & Plan     No evidence of valve thrombosis on recent echocardiogram, and gradients are acceptable.  Can discontinue Plavix at this time.         Relevant Orders    ECG 12 Lead    Coronary artery disease of native artery with stable angina pectoris (CMS/[facility])    Overview     Last diagnostic heart catheterization performed in [place] by Dr. Broadbent on 4/28/17 prior to transfer, which showed nonobstructive disease in the left main and LAD.  Circumflex had a complex bifurcation stenosis (Luke 1, 1, 1) involving the takeoff of an obtuse marginal branch.  Right-PDA had previously been intervened upon with balloon angioplasty but stent reportedly could not be delivered due to calcification in 2015 by Dr. Broadbent.  Severely calcified and severe stenosis in the proximal PDA was visualized (99%, CARLOS-1 flow) on cath 4/28/17.         Current Assessment & Plan     Stable; continue aspirin, statin, ACE inhibitor, and resume beta blocker as per elsewhere.  I discussed options of considering repeat catheterization with PCI to lesions described above should the patient's symptoms become lifestyle-limiting, but at this point he feels they are very stable and is more limited by his extreme exertional dyspnea due to pulmonary hypertension.         Relevant Medications    metoprolol tartrate (LOPRESSOR) 25 MG tablet    Non-rheumatic mitral valve  stenosis    Current Assessment & Plan     Moderate on echo; will resume BB at low dose to improve ventricular filling and hopefully improve FOOTE some         Relevant Medications    metoprolol tartrate (LOPRESSOR) 25 MG tablet - 12.5mg PO BID       Respiratory    Chronic respiratory failure with hypoxia (CMS/HCC)    Overview     Due to pulmonary hypertension.  Wears oxygen with exertion and at night.               Patrick Trujillo MD  07/31/2018  10:30 PM

## 2018-08-09 RX ORDER — METOPROLOL TARTRATE 50 MG/1
TABLET, FILM COATED ORAL
Qty: 60 TABLET | Refills: 0 | OUTPATIENT
Start: 2018-08-09

## 2018-08-09 NOTE — TELEPHONE ENCOUNTER
Ok - did he start taking the lopressor 12.5mg bid that I prescribed at last visit? If so, please have him stop taking it now.  That wouldn't make his oxygen drop, but might drop his blood pressure a little

## 2018-08-09 NOTE — TELEPHONE ENCOUNTER
Patient wife calling in stating that since he has been here he has had a couple of episodes where he just collapses.   States his condition has worsened some, and is wanting to know if his meds could be causing that?    O2 drops to about 68 when he stands but not sure on BP - states it does take about 10 minutes to get back up to just 80%   States that his BP has been running in the 90/60's range.     States he feels fine while sitting.   Near syncope - wife states he has not passed out all the way.     Please advise.

## 2018-08-21 ENCOUNTER — TELEPHONE (OUTPATIENT)
Dept: CARDIOLOGY | Facility: CLINIC | Age: 73
End: 2018-08-21

## 2018-08-21 NOTE — TELEPHONE ENCOUNTER
Pharmacy called stating that patient was requesting a 90 day supply on his Lopressor. 1 year supply was sent on 7/31/18. Gave pharmacy a verbal to fill the remaining refills in 90 day supply.

## (undated) DEVICE — SOL IRR NACL 0.9PCT BT 1000ML

## (undated) DEVICE — SOL NS 500ML

## (undated) DEVICE — ELECTRD PAD DEFIB A/

## (undated) DEVICE — KT MANIFLD RT CUST BHPAD

## (undated) DEVICE — STERILE (10.2 X 147CM) TELESCOPICALLY-FOLDED COVER: Brand: CIV-FLEX™ TRANSDUCER COVER

## (undated) DEVICE — SWAN-GANZ POLYMER BLEND TRUE SIZE C-TIP CONTROLCATH TD: Brand: SWAN-GANZ CONTROLCATH TRUE SIZE

## (undated) DEVICE — STPCK 3/WY HP M/RA W/OFF/HNDL 1050PSI STRL

## (undated) DEVICE — PK CATH CARD 30

## (undated) DEVICE — Device: Brand: MEDEX

## (undated) DEVICE — SOLIDIFIER LIQUI LOC PLUS 2000CC

## (undated) DEVICE — CANN CO2/O2 NASL A/

## (undated) DEVICE — PINNACLE INTRODUCER SHEATH: Brand: PINNACLE